# Patient Record
Sex: MALE | Race: OTHER | HISPANIC OR LATINO | URBAN - METROPOLITAN AREA
[De-identification: names, ages, dates, MRNs, and addresses within clinical notes are randomized per-mention and may not be internally consistent; named-entity substitution may affect disease eponyms.]

---

## 2020-07-24 ENCOUNTER — EMERGENCY (EMERGENCY)
Facility: HOSPITAL | Age: 20
LOS: 1 days | Discharge: HOME | End: 2020-07-24
Attending: EMERGENCY MEDICINE | Admitting: EMERGENCY MEDICINE
Payer: MEDICAID

## 2020-07-24 VITALS
RESPIRATION RATE: 16 BRPM | HEART RATE: 88 BPM | DIASTOLIC BLOOD PRESSURE: 69 MMHG | TEMPERATURE: 98 F | OXYGEN SATURATION: 99 % | SYSTOLIC BLOOD PRESSURE: 117 MMHG

## 2020-07-24 VITALS
HEART RATE: 94 BPM | DIASTOLIC BLOOD PRESSURE: 71 MMHG | TEMPERATURE: 98 F | OXYGEN SATURATION: 98 % | RESPIRATION RATE: 18 BRPM | WEIGHT: 130.07 LBS | SYSTOLIC BLOOD PRESSURE: 126 MMHG

## 2020-07-24 DIAGNOSIS — F31.78 BIPOLAR DISORDER, IN FULL REMISSION, MOST RECENT EPISODE MIXED: ICD-10-CM

## 2020-07-24 DIAGNOSIS — F19.20 OTHER PSYCHOACTIVE SUBSTANCE DEPENDENCE, UNCOMPLICATED: ICD-10-CM

## 2020-07-24 DIAGNOSIS — F10.20 ALCOHOL DEPENDENCE, UNCOMPLICATED: ICD-10-CM

## 2020-07-24 DIAGNOSIS — Z89.111 ACQUIRED ABSENCE OF RIGHT HAND: Chronic | ICD-10-CM

## 2020-07-24 DIAGNOSIS — F31.70 BIPOLAR DISORDER, CURRENTLY IN REMISSION, MOST RECENT EPISODE UNSPECIFIED: ICD-10-CM

## 2020-07-24 LAB
ALBUMIN SERPL ELPH-MCNC: 4.2 G/DL — SIGNIFICANT CHANGE UP (ref 3.3–5)
ALP SERPL-CCNC: 49 U/L — SIGNIFICANT CHANGE UP (ref 40–120)
ALT FLD-CCNC: 25 U/L — SIGNIFICANT CHANGE UP (ref 10–45)
ANION GAP SERPL CALC-SCNC: 13 MMOL/L — SIGNIFICANT CHANGE UP (ref 5–17)
APPEARANCE UR: CLEAR — SIGNIFICANT CHANGE UP
AST SERPL-CCNC: 33 U/L — SIGNIFICANT CHANGE UP (ref 10–40)
BACTERIA # UR AUTO: PRESENT /HPF
BASOPHILS # BLD AUTO: 0.06 K/UL — SIGNIFICANT CHANGE UP (ref 0–0.2)
BASOPHILS NFR BLD AUTO: 1.3 % — SIGNIFICANT CHANGE UP (ref 0–2)
BILIRUB SERPL-MCNC: 0.3 MG/DL — SIGNIFICANT CHANGE UP (ref 0.2–1.2)
BILIRUB UR-MCNC: NEGATIVE — SIGNIFICANT CHANGE UP
BUN SERPL-MCNC: 17 MG/DL — SIGNIFICANT CHANGE UP (ref 7–23)
CALCIUM SERPL-MCNC: 9.1 MG/DL — SIGNIFICANT CHANGE UP (ref 8.4–10.5)
CHLORIDE SERPL-SCNC: 99 MMOL/L — SIGNIFICANT CHANGE UP (ref 96–108)
CO2 SERPL-SCNC: 28 MMOL/L — SIGNIFICANT CHANGE UP (ref 22–31)
COLOR SPEC: YELLOW — SIGNIFICANT CHANGE UP
CREAT SERPL-MCNC: 0.87 MG/DL — SIGNIFICANT CHANGE UP (ref 0.5–1.3)
DIFF PNL FLD: NEGATIVE — SIGNIFICANT CHANGE UP
EOSINOPHIL # BLD AUTO: 0.16 K/UL — SIGNIFICANT CHANGE UP (ref 0–0.5)
EOSINOPHIL NFR BLD AUTO: 3.6 % — SIGNIFICANT CHANGE UP (ref 0–6)
EPI CELLS # UR: SIGNIFICANT CHANGE UP /HPF (ref 0–5)
ETHANOL SERPL-MCNC: <10 MG/DL — SIGNIFICANT CHANGE UP (ref 0–10)
GLUCOSE SERPL-MCNC: 85 MG/DL — SIGNIFICANT CHANGE UP (ref 70–99)
GLUCOSE UR QL: NEGATIVE — SIGNIFICANT CHANGE UP
HCT VFR BLD CALC: 36.6 % — LOW (ref 39–50)
HGB BLD-MCNC: 11.9 G/DL — LOW (ref 13–17)
IMM GRANULOCYTES NFR BLD AUTO: 0.4 % — SIGNIFICANT CHANGE UP (ref 0–1.5)
KETONES UR-MCNC: NEGATIVE — SIGNIFICANT CHANGE UP
LEUKOCYTE ESTERASE UR-ACNC: NEGATIVE — SIGNIFICANT CHANGE UP
LYMPHOCYTES # BLD AUTO: 1.65 K/UL — SIGNIFICANT CHANGE UP (ref 1–3.3)
LYMPHOCYTES # BLD AUTO: 37 % — SIGNIFICANT CHANGE UP (ref 13–44)
MAGNESIUM SERPL-MCNC: 2 MG/DL — SIGNIFICANT CHANGE UP (ref 1.6–2.6)
MCHC RBC-ENTMCNC: 29.8 PG — SIGNIFICANT CHANGE UP (ref 27–34)
MCHC RBC-ENTMCNC: 32.5 GM/DL — SIGNIFICANT CHANGE UP (ref 32–36)
MCV RBC AUTO: 91.7 FL — SIGNIFICANT CHANGE UP (ref 80–100)
MONOCYTES # BLD AUTO: 0.76 K/UL — SIGNIFICANT CHANGE UP (ref 0–0.9)
MONOCYTES NFR BLD AUTO: 17 % — HIGH (ref 2–14)
NEUTROPHILS # BLD AUTO: 1.81 K/UL — SIGNIFICANT CHANGE UP (ref 1.8–7.4)
NEUTROPHILS NFR BLD AUTO: 40.7 % — LOW (ref 43–77)
NITRITE UR-MCNC: NEGATIVE — SIGNIFICANT CHANGE UP
NRBC # BLD: 0 /100 WBCS — SIGNIFICANT CHANGE UP (ref 0–0)
PCP SPEC-MCNC: SIGNIFICANT CHANGE UP
PH UR: 8.5 — HIGH (ref 5–8)
PLATELET # BLD AUTO: 242 K/UL — SIGNIFICANT CHANGE UP (ref 150–400)
POTASSIUM SERPL-MCNC: 4.1 MMOL/L — SIGNIFICANT CHANGE UP (ref 3.5–5.3)
POTASSIUM SERPL-SCNC: 4.1 MMOL/L — SIGNIFICANT CHANGE UP (ref 3.5–5.3)
PROT SERPL-MCNC: 6.8 G/DL — SIGNIFICANT CHANGE UP (ref 6–8.3)
PROT UR-MCNC: 30 MG/DL
RBC # BLD: 3.99 M/UL — LOW (ref 4.2–5.8)
RBC # FLD: 12.8 % — SIGNIFICANT CHANGE UP (ref 10.3–14.5)
SODIUM SERPL-SCNC: 140 MMOL/L — SIGNIFICANT CHANGE UP (ref 135–145)
SP GR SPEC: 1.01 — SIGNIFICANT CHANGE UP (ref 1–1.03)
UROBILINOGEN FLD QL: 0.2 E.U./DL — SIGNIFICANT CHANGE UP
VALPROATE SERPL-MCNC: 7.9 UG/ML — LOW (ref 50–100)
WBC # BLD: 4.46 K/UL — SIGNIFICANT CHANGE UP (ref 3.8–10.5)
WBC # FLD AUTO: 4.46 K/UL — SIGNIFICANT CHANGE UP (ref 3.8–10.5)
WBC UR QL: ABNORMAL /HPF

## 2020-07-24 PROCEDURE — 80307 DRUG TEST PRSMV CHEM ANLYZR: CPT

## 2020-07-24 PROCEDURE — 93010 ELECTROCARDIOGRAM REPORT: CPT

## 2020-07-24 PROCEDURE — 36415 COLL VENOUS BLD VENIPUNCTURE: CPT

## 2020-07-24 PROCEDURE — 93005 ELECTROCARDIOGRAM TRACING: CPT

## 2020-07-24 PROCEDURE — 80053 COMPREHEN METABOLIC PANEL: CPT

## 2020-07-24 PROCEDURE — 85025 COMPLETE CBC W/AUTO DIFF WBC: CPT

## 2020-07-24 PROCEDURE — 90792 PSYCH DIAG EVAL W/MED SRVCS: CPT

## 2020-07-24 PROCEDURE — 83735 ASSAY OF MAGNESIUM: CPT

## 2020-07-24 PROCEDURE — 99285 EMERGENCY DEPT VISIT HI MDM: CPT | Mod: 25

## 2020-07-24 PROCEDURE — 99284 EMERGENCY DEPT VISIT MOD MDM: CPT

## 2020-07-24 PROCEDURE — 80164 ASSAY DIPROPYLACETIC ACD TOT: CPT

## 2020-07-24 PROCEDURE — 81001 URINALYSIS AUTO W/SCOPE: CPT

## 2020-07-24 RX ORDER — ACETAMINOPHEN 500 MG
650 TABLET ORAL ONCE
Refills: 0 | Status: COMPLETED | OUTPATIENT
Start: 2020-07-24 | End: 2020-07-24

## 2020-07-24 RX ADMIN — Medication 650 MILLIGRAM(S): at 16:46

## 2020-07-24 NOTE — ED BEHAVIORAL HEALTH ASSESSMENT NOTE - OTHER PAST PSYCHIATRIC HISTORY (INCLUDE DETAILS REGARDING ONSET, COURSE OF ILLNESS, INPATIENT/OUTPATIENT TREATMENT)
Recently admitted for 2 weeks at [facility] and started on depakote 500 mg q12h and what sounds like an antipsychotic too. Has had prior psychiatric admissions in past. Bipolar disorder was diagnosed but substance induced mood d/o also sounds quite possible. Nonadherent with past treatment.

## 2020-07-24 NOTE — ED BEHAVIORAL HEALTH ASSESSMENT NOTE - SUICIDE PROTECTIVE FACTORS
Supportive social network of family or friends/Has future plans/Responsibility to family and others/Identifies reasons for living
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
## 2020-07-24 NOTE — ED BEHAVIORAL HEALTH ASSESSMENT NOTE - OTHER
well-tended afro, amputation site still bandaged. a little odd, very smiley, quite possibly still intoxicated not assessed in chair none not elevated to the point of being manic impoverished, a little circumstantial

## 2020-07-24 NOTE — ED BEHAVIORAL HEALTH ASSESSMENT NOTE - PRIMARY DX
Bipolar disorder in partial remission, most recent episode unspecified type Bipolar disorder, in full remission, most recent episode mixed

## 2020-07-24 NOTE — ED PROVIDER NOTE - OBJECTIVE STATEMENT
21 y/o M with PMHx of R hand amputation 2 months ago presents to the ED c/o feeling eyes rolling up to head and HA after taking a medication that is prescribed for anxiety to relax. States not recalling the medication prescribed by psych 1.5 weeks ago. States sxs occur 2 hours after taking medications and resolves over time. Pt asymptomatic. No fever, chills, neck pain/ stiffness, N/V, numbness/tingling, weakness, vision changes, convulsive activity, inconstance, CP, or SOB. SHx of smoking and etoh use. No drug use. 21 y/o M with PMHx of R hand amputation 2 months ago presents to the ED c/o feeling eyes rolling up to head and HA after taking a medication that is prescribed for anxiety to relax. States not recalling the medication prescribed by psych 1.5 weeks ago. States sxs occur 2 hours after taking medications and resolves over time. Pt asymptomatic. No fever, chills, neck pain/ stiffness, N/V, numbness/tingling, weakness, vision changes, convulsive activity, inconstance, CP, or SOB. SHx of smoking and etoh use. No drug use. Pt is poor historian. 19 y/o M with PMHx of R hand amputation 2 months ago presents to the ED c/o feeling eyes rolling up to head and HA after taking a medication that was prescribed to help him "relax". Cannot recall name of medication; was prescribed by his psychiatrist 1.5 weeks ago. States sxs occur ~ 2 hours after taking medication and resolves over time. Pt asymptomatic at present. Not worst ha of life. No associated fever, chills, neck pain/ stiffness, N/V, numbness/tingling, weakness, vision changes, convulsive activity, incontinence, CP, or SOB. + SHx of smoking and etoh use. No drug use. Pt is poor historian. Denies si, hi, hallucinations.

## 2020-07-24 NOTE — ED PROVIDER NOTE - CLINICAL SUMMARY MEDICAL DECISION MAKING FREE TEXT BOX
sxs of HA and eyes rolling to top of head after taking a new meds prescribed by psych. Did not recall the medication. Pt afebrile hemodynamically stable, neck supple no meningeal signs. Will call father for more information  No signs of intercranial hemorrhage or infectious processes. Will continue to monitor. Impression: sxs of HA and eyes rolling to top of head after being started on new psych med 1 1/2 wks ago. Pt is currently asymptomatic. Neuro exam non-focal. Pt afebrile hemodynamically stable, neck supple no meningeal signs. Spoke w/ pt's father- refer to progress note. Psych consult obtained given pt's bizarre affect and hx. Labs reviewed and unremarkable. Valproic acid subtherapeutic. Pt cleared for dc with outpt psych f/u. Pt was likely given an anti-psychotic which may have caused eps. Pt to continue taking prescribed meds and f/u with psych as an outpt.

## 2020-07-24 NOTE — ED ADULT NURSE REASSESSMENT NOTE - NS ED NURSE REASSESS COMMENT FT1
lab states that urine Ph is too high they cannot process Utox with this sample. Will request a second sample from pt when psych is finished

## 2020-07-24 NOTE — ED PROVIDER NOTE - PHYSICAL EXAMINATION
VITAL SIGNS: I have reviewed nursing notes and confirm.  CONSTITUTIONAL: s/p R amputee Well-developed; well-nourished; in no acute distress.   SKIN:  warm and dry, no acute rash.   HEAD:  normocephalic, atraumatic.  EYES: EOM intact; conjunctiva and sclera clear.  ENT: No nasal discharge; airway clear.   NECK: Supple; non tender.  CARD: S1, S2 normal; no murmurs, gallops, or rubs. Regular rate and rhythm.   RESP:  Clear to auscultation b/l, no wheezes, rales or rhonchi.  ABD: Normal bowel sounds; soft; non-distended; non-tender; no guarding/ rebound.  EXT: Normal ROM. No clubbing, cyanosis or edema. 2+ pulses to b/l ue/le.  NEURO: Alert, oriented, grossly unremarkable AnOx3. cranial nerves 2-12 intact. Motor sensation intact b/l  PSYCH: Cooperative. Bizarre affect. Pt poor historian. VITAL SIGNS: I have reviewed nursing notes and confirm.  CONSTITUTIONAL: s/p R amputee Well-developed; well-nourished; in no acute distress.   SKIN:  warm and dry, no acute rash.   HEAD:  normocephalic, atraumatic.  EYES: PERRLA. EOM intact; conjunctiva and sclera clear.  ENT: No nasal discharge; airway clear.   NECK: Supple; non tender.  CARD: S1, S2 normal; no murmurs, gallops, or rubs. Regular rate and rhythm.   RESP:  Clear to auscultation b/l, no wheezes, rales or rhonchi.  ABD: Normal bowel sounds; soft; non-distended; non-tender; no guarding/ rebound.  EXT: s/p amputation or r hand.  NEURO: Alert, oriented x 3, CN II-XII grossly intact. Motor/ sensation intact b/l.  PSYCH: Cooperative. Bizarre affect. Occasional inappropriate laugh.
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
## 2020-07-24 NOTE — ED BEHAVIORAL HEALTH ASSESSMENT NOTE - SUMMARY
19 y/o [place]>English speaking M with PMHx of R hand amputation 2 months ago presents to the ED c/o feeling eyes rolling up to head and HA after taking a medication that is prescribed for anxiety to relax. States not recalling the medication prescribed by psych 1.5 weeks ago. States sxs occur 2 hours after taking medications and resolves over time. Pt asymptomatic. No fever, chills, neck pain/ stiffness, N/V, numbness/tingling, weakness, vision changes, convulsive activity, inconstance, CP, or SOB. SHx of smoking and etoh use. No drug use. Pt is poor historian and seems as if he might still be somewhat intoxicated. His father told me he was discharged from [facility] emergency room yesterday but patient is telling he had been on the psychiatric unit there for 2 weeks for bipolar disorder. He says he was given depakote 500 mg q12h. After pressing him it seems they may have been given another medication for voices which might have caused EPS or oculogyric crisis as what he describes sounds like oculogyric crisis. No EPS appreciated during my assessment. Although pt raymond substance use other than weed, father says he smokes K2 regularly. He drinks a small bottle of E&J jabari daily. Pt at [facility] and can't live with parents as they had also been living at a shelter but got an apartment where he can't stay. No SI/HI/AH/VH/P. VPA level was 8 suggesting immediate nonadherence. Pt doesn't even remember what doctor he was supposed to follow up with. 19 y/o [place]>English speaking M with PMHx of R hand amputation 2 months ago presents to the ED c/o feeling eyes rolling up to head and HA after taking a medication that is prescribed for anxiety to relax. States not recalling the medication prescribed by psych 1.5 weeks ago. States sxs occur 2 hours after taking medications and resolves over time. Pt asymptomatic. No fever, chills, neck pain/ stiffness, N/V, numbness/tingling, weakness, vision changes, convulsive activity, inconstance, CP, or SOB. SHx of smoking and etoh use. No drug use. Pt is poor historian and seems as if he might still be somewhat intoxicated. His father told me he was discharged from [facility] emergency room yesterday but patient is telling he had been on the psychiatric unit there for 2 weeks for bipolar disorder. He says he was given depakote 500 mg q12h. After pressing him it seems they may have been given another medication for voices which might have caused EPS or oculogyric crisis as what he describes sounds like oculogyric crisis. No EPS appreciated during my assessment. Although pt raymond substance use other than weed, father says he smokes K2 regularly. He drinks a small bottle of E&J jabari daily. Pt at [facility] and can't live with parents as they had also been living at a shelter but got an apartment where he can't stay. No SI/HI/AH/VH/P. VPA level was 8 suggesting immediate nonadherence. Pt doesn't even remember what doctor he was supposed to follow up with.    1)He can be discharged to shelter as he is not a danger ot self or others at this time. He does not meet criteria for involuntary hospitalization and he says he was just discharged from inpatient unit at [facility] yesterday.     2)He should continue depakote 500 mg q12h for mood stabilization only. I do not know what other medication he may be taking that possibly caused EPS/oculogyric crisis.     3)He can follow up with psychiatrist recommended by [facility] if he can find discharge papers or remember name, address and appointment time but I also gave him info for [facility], Centralized Intake Unit, [facility] [address] [facility] [address] Floor, [place] [address]. He can go there at 8:30 am on Monday, 7/27/20.

## 2020-07-24 NOTE — ED ADULT NURSE REASSESSMENT NOTE - NS ED NURSE REASSESS COMMENT FT1
pt placed on 1:1 CO for safety and elopement risk due to bizarre behavior. Pt willingly provided blood urine and ekg in progress. Pt willingly changed into gown and put belongings into bag, security called for wanding
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
## 2020-07-24 NOTE — ED PROVIDER NOTE - CARE PLAN
Principal Discharge DX:	Nonintractable headache, unspecified chronicity pattern, unspecified headache type  Secondary Diagnosis:	Bipolar affective disorder, remission status unspecified  Secondary Diagnosis:	Substance abuse

## 2020-07-24 NOTE — ED ADULT NURSE REASSESSMENT NOTE - NS ED NURSE REASSESS COMMENT FT1
pt was provided with 3 page outpatient resource packet for psych. Pt calm cooperative requesting copies of blood work with Dc papers

## 2020-07-24 NOTE — ED PROVIDER NOTE - PATIENT PORTAL LINK FT
You can access the FollowMyHealth Patient Portal offered by [facility] by registering at the following website: [email or web address]. By joining [facility]’s FollowMyHealth portal, you will also be able to view your health information using other applications (apps) compatible with our system.

## 2020-07-24 NOTE — ED PROVIDER NOTE - CHPI ED SYMPTOMS NEG
No fever, chills, neck pain/ stiffness, N/V, numbness/tingling, weakness, vision changes, convulsive activity, inconstance, CP, or SOB.
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
## 2020-07-24 NOTE — ED BEHAVIORAL HEALTH ASSESSMENT NOTE - REFERRAL / APPOINTMENT DETAILS
[facility], Centralized Intake Unit, [address] [facility] 1[address] Floor, [place] [address]. He can go there at 8:30 am on Monday, 7/27/20.

## 2020-07-28 DIAGNOSIS — R51 HEADACHE: ICD-10-CM

## 2020-07-28 DIAGNOSIS — F15.10 OTHER STIMULANT ABUSE, UNCOMPLICATED: ICD-10-CM

## 2020-07-28 DIAGNOSIS — F31.9 BIPOLAR DISORDER, UNSPECIFIED: ICD-10-CM

## 2020-07-28 DIAGNOSIS — F12.10 CANNABIS ABUSE, UNCOMPLICATED: ICD-10-CM

## 2021-12-14 NOTE — ED BEHAVIORAL HEALTH ASSESSMENT NOTE - CURRENT INTENT:
Enbrel Pregnancy And Lactation Text: This medication is Pregnancy Category B and is considered safe during pregnancy. It is unknown if this medication is excreted in breast milk. None known

## 2024-05-17 NOTE — ED BEHAVIORAL HEALTH ASSESSMENT NOTE - SAFETY PLAN DETAILS
[TextEntry] : 10 point review of systems is normal other than what is listed above in the history of present illness.
Dl Marcelino safety plan not warranted

## 2025-06-05 NOTE — ED BEHAVIORAL HEALTH ASSESSMENT NOTE - FAMILY HISTORY OF MEDICAL ILLNESS
Health Maintenance       COVID-19 Vaccine (4 - 2024-25 season)  Overdue since 9/1/2024    Diabetes Eye Exam (Yearly)  Overdue since 2/22/2025    Pneumococcal Vaccine 50+ (1 of 2 - PCV)  Never done    Colorectal Cancer Screening  Never done    Shingles Vaccine (1 of 2)  Never done           Following review of the above:  Patient is not proceeding with: Colorectal Cancer Screening, Diabetes Eye Exam, COVID-19, and Shingles    Note: Refer to final orders and clinician documentation.       None known